# Patient Record
(demographics unavailable — no encounter records)

---

## 2025-04-21 NOTE — HISTORY OF PRESENT ILLNESS
[FreeTextEntry1] : patient here with daughter Michelle, for f/u after recent ER visti ( see below ) denies dysuria , no uits, no diff to void, bends over to empty , feels empty after void , no INC, some urgency if hold too long, nocutria 1-2 x, no strain to void, no pad use some constipation  ER NOTE ( April 16, 2025) Images reviewed wiht patient and daugther along with imaging from 2019 ( no change ) 90 y/o female, hx of kidney stones, presents to the ER with right sided flank pain and hematuria. states started today. denies f/n/v/d, CP, SOB, HA, dizziness, urinary urgency or frequency. LABS: wbc - 7, creat 0.61, ua 7 red cells and cx negative CT SCAN : ( c/w 2019) KIDNEYS/URETERS: Cortical scarring of the mid and lower poles of the right kidney with associated calcifications similar to prior. No hydronephrosis. No obstructing calculus. BLADDER: Underdistended. REPRODUCTIVE ORGANS: Uterine and adnexal calcifications similar to prior.  12/17/2021.- Dr Tompkins: mini-sling, anterior repair  NOv 2020: Dr jasbir plascencia Echogenicity: Exam appears to be unremarkable. Both kidneys are normal in size and echogenicity without hydronephrosis, stones or solid masses present. Pt. has not done LL and is still on K citrate. Will do LL and will speak to her daughter when results are available.

## 2025-04-21 NOTE — PHYSICAL EXAM
[Normal Appearance] : normal appearance [Well Groomed] : well groomed [General Appearance - In No Acute Distress] : no acute distress [Edema] : no peripheral edema [Respiration, Rhythm And Depth] : normal respiratory rhythm and effort [Exaggerated Use Of Accessory Muscles For Inspiration] : no accessory muscle use [Abdomen Soft] : soft [Abdomen Tenderness] : non-tender [Normal Station and Gait] : the gait and station were normal for the patient's age [] : no rash [No Focal Deficits] : no focal deficits [Oriented To Time, Place, And Person] : oriented to person, place, and time [Affect] : the affect was normal [Mood] : the mood was normal [de-identified] : see cysto note  [MA] : MA [FreeTextEntry2] : Alvarez Chandler

## 2025-04-21 NOTE — ASSESSMENT
[FreeTextEntry1] : 1- CYSTO - done today and nrorma 2- patient advised to increase fluids 3- CT images show NO CHANGE in righ LP scar / calcification  4- advised to discussed d/c K citrate ( on for past 5 years ) without any [facility] f/u as suggested by dr plascencia when last seen by him 2020